# Patient Record
Sex: MALE | Race: WHITE | URBAN - METROPOLITAN AREA
[De-identification: names, ages, dates, MRNs, and addresses within clinical notes are randomized per-mention and may not be internally consistent; named-entity substitution may affect disease eponyms.]

---

## 2022-08-15 ENCOUNTER — APPOINTMENT (OUTPATIENT)
Dept: GENERAL RADIOLOGY | Age: 22
End: 2022-08-15

## 2022-08-15 ENCOUNTER — HOSPITAL ENCOUNTER (EMERGENCY)
Age: 22
Discharge: HOME | End: 2022-08-15
Attending: EMERGENCY MEDICINE

## 2022-08-15 VITALS
RESPIRATION RATE: 18 BRPM | BODY MASS INDEX: 23.03 KG/M2 | TEMPERATURE: 98.8 F | HEART RATE: 116 BPM | WEIGHT: 170 LBS | DIASTOLIC BLOOD PRESSURE: 91 MMHG | HEIGHT: 72 IN | OXYGEN SATURATION: 99 % | SYSTOLIC BLOOD PRESSURE: 123 MMHG

## 2022-08-15 DIAGNOSIS — L03.119 CELLULITIS AND ABSCESS OF LEG: Primary | ICD-10-CM

## 2022-08-15 DIAGNOSIS — L02.419 CELLULITIS AND ABSCESS OF LEG: Primary | ICD-10-CM

## 2022-08-15 PROCEDURE — 99283 EMERGENCY DEPT VISIT LOW MDM: CPT

## 2022-08-15 PROCEDURE — 73562 X-RAY EXAM OF KNEE 3: CPT

## 2022-08-15 RX ORDER — ONDANSETRON 4 MG/1
4 TABLET, FILM COATED ORAL 3 TIMES DAILY PRN
Qty: 15 TABLET | Refills: 0 | Status: SHIPPED | OUTPATIENT
Start: 2022-08-15

## 2022-08-15 RX ORDER — NAPROXEN 375 MG/1
375 TABLET ORAL 2 TIMES DAILY PRN
Qty: 60 TABLET | Refills: 0 | Status: SHIPPED | OUTPATIENT
Start: 2022-08-15

## 2022-08-15 RX ORDER — CHLORHEXIDINE GLUCONATE 4 G/100ML
SOLUTION TOPICAL
Qty: 473 ML | Refills: 0 | Status: SHIPPED | OUTPATIENT
Start: 2022-08-15 | End: 2022-08-29

## 2022-08-15 RX ORDER — CLINDAMYCIN HYDROCHLORIDE 150 MG/1
450 CAPSULE ORAL 3 TIMES DAILY
Qty: 90 CAPSULE | Refills: 0 | Status: SHIPPED | OUTPATIENT
Start: 2022-08-15 | End: 2022-08-25

## 2022-08-15 NOTE — DISCHARGE INSTRUCTIONS
Take the full course of antibiotics. Please follow-up with primary care physician or return to the emergency department in 48 hours for reevaluation.     Return to the emergency department sooner for increasing pain, redness, swelling, pain on movement of your knee, any other concerning symptoms

## 2022-08-15 NOTE — Clinical Note
Toni Willoughby was seen and treated in our emergency department on 8/15/2022. He may return to work on 08/18/2022. If you have any questions or concerns, please don't hesitate to call.       Ashkan Mcgee MD

## 2022-08-15 NOTE — ED NOTES
Discharge education reviewed. Questions answered. Medications clarified. Belongings gathered. Discharge instructions signed. All belongings accounted for. Patient discharged to home via pov.        Amber Jauregui RN  08/15/22 [id number]

## 2022-08-15 NOTE — ED PROVIDER NOTES
Oral (!) 116 18 99 % 6' (1.829 m) 170 lb (77.1 kg)     GENERAL APPEARANCE: Awake and alert. Cooperative. No acute distress. HEAD: Normocephalic. Atraumatic. EYES: Sclera anicteric. Pupils equal round reactive to light extraocular movements are intact  ENT: Tolerates saliva. NECK: Supple. Trachea midline. CARDIO: RRR. Radial pulse 2+. No murmurs rubs or gallops appreciated  LUNGS: Respirations unlabored. CTAB. ABDOMEN: Soft. Non-distended. Non-tender. EXTREMITIES: No acute deformities. No tenderness to palpation over the bony prominence of the fibular head or of the patella. No pain on passive motion of the patient's left knee. No tenderness when palpating over the tibial plateau. No left knee calf tenderness. SKIN: Warm and dry. Mild erythema edema noted to anterior aspect of patient's left knee. Minimal active drainage is appreciated. No fluid-filled collections that might be amenable to incision and drainage on initial palpation. NEUROLOGICAL:  Cranial nerves II through XII grossly intact. No gross facial drooping. Moves all 4 extremities spontaneously. PSYCHIATRIC: Normal mood. Alert and oriented x3. No reported active suicidality or homicidality. Diagnostics   Labs:  No results found for this visit on 08/15/22. Radiographs:  XR KNEE LEFT (3 VIEWS)    Result Date: 8/15/2022  EXAMINATION: THREE XRAY VIEWS OF THE LEFT KNEE 8/15/2022 5:18 pm COMPARISON: None. HISTORY: ORDERING SYSTEM PROVIDED HISTORY: left knee swelling TECHNOLOGIST PROVIDED HISTORY: Reason for exam:->left knee swelling FINDINGS: Osseous structures of the knee are intact and align normally. No retained radiopaque foreign body. Joint spaces appear well maintained. No acute osseous abnormality evident. Follow-up or additional imaging should be considered if pain persists or worsens.       Procedures/EKG:       ED Course and MDM   In brief, Ann Marie Reza is a 20 y.o. male who presented to the emergency department for evaluation of redness and swelling to the anterior aspect of his left knee. Based on patient's history and physical does seem most consistent with a cellulitis or possible abscess. Other possibilities patient's symptoms do include infected bursitis. There is no pain on passive motion of the patient's left knee no pain when palpating throughout the knee joint low clinical suspicion for septic arthritis. I did review patient's imaging studies as noted above. I do not believe any laboratory work is necessary at this time. I did perform an ultrasound of the patient's right knee. No fluid was noted in the joint. No prepatellar fluid collections are appreciated on ultrasound. Discussed the findings with patient and family at bedside. Recommends starting the patient on clindamycin for infection milligrams p.o. 3 times daily as well as use of chlorhexidine scrub on his knee. Close follow-up in the next 48 hours return to the emergency department for reevaluation return to emergency department sooner for increasing pain, swelling, difficulty walking or any other concerning symptoms he did express a verbal understanding of these instructions and does feel comfortable to be discharged home    ED Medication Orders (From admission, onward)      None            Final Impression      1.  Cellulitis and abscess of leg      DISPOSITION Decision To Discharge 08/15/2022 06:50:20 PM         (Please note that portions of this note may have been completed with a voice recognition program. Efforts were made to edit the dictations but occasionally words are mis-transcribed.)    Ashkan Mcgee MD  [address] [place] [address]        Ashkan Mcgee MD  08/15/22 [id number]

## 2022-08-18 ENCOUNTER — HOSPITAL ENCOUNTER (EMERGENCY)
Age: 22
Discharge: HOME | End: 2022-08-18
Attending: EMERGENCY MEDICINE

## 2022-08-18 VITALS
RESPIRATION RATE: 16 BRPM | HEIGHT: 72 IN | DIASTOLIC BLOOD PRESSURE: 84 MMHG | TEMPERATURE: 98.5 F | HEART RATE: 108 BPM | OXYGEN SATURATION: 97 % | WEIGHT: 170 LBS | SYSTOLIC BLOOD PRESSURE: 127 MMHG | BODY MASS INDEX: 23.03 KG/M2

## 2022-08-18 DIAGNOSIS — L02.416 ABSCESS OF SKIN OF LEFT KNEE: Primary | ICD-10-CM

## 2022-08-18 PROCEDURE — 99282 EMERGENCY DEPT VISIT SF MDM: CPT

## 2022-08-18 ASSESSMENT — PAIN - FUNCTIONAL ASSESSMENT: PAIN_FUNCTIONAL_ASSESSMENT: 0-10

## 2022-08-18 ASSESSMENT — LIFESTYLE VARIABLES
HOW MANY STANDARD DRINKS CONTAINING ALCOHOL DO YOU HAVE ON A TYPICAL DAY: PATIENT DOES NOT DRINK
HOW OFTEN DO YOU HAVE A DRINK CONTAINING ALCOHOL: NEVER

## 2022-08-18 ASSESSMENT — PAIN SCALES - GENERAL: PAINLEVEL_OUTOF10: 3

## 2022-08-18 ASSESSMENT — PAIN DESCRIPTION - LOCATION: LOCATION: KNEE

## 2022-08-18 NOTE — Clinical Note
Davey Kawasaki was seen and treated in our emergency department on 8/18/2022. He may return to work on 08/22/2022. If you have any questions or concerns, please don't hesitate to call.       Mohit Bojorquez, DO

## 2022-08-19 NOTE — DISCHARGE INSTRUCTIONS
Establish and follow-up with your primary care physician for reevaluation. Call for an appointment  Take clindamycin antibiotic as prescribed and directed  Use Hibiclens antibacterial soap as prescribed and directed  Take Tylenol alternate with Motrin as needed for pain  Return to the emergency department immediately increased pain swelling fever numbness and tingling in his EXTR or worsening symptoms.

## 2022-08-19 NOTE — ED PROVIDER NOTES
Emergency Department Encounter    Patient: Elvin Lamra  MRN: [id number]  : 2000  Date of Evaluation: 2022  ED Provider:  Da Brandt DO    Triage Chief Complaint:   Wound Check    [place]:  Elvin Lamar is a 20 y.o. male that presents to the emergency department for evaluation of left knee wound infection. Patient states he had an abscess and cellulitis to the left knee. He states he was placed on clindamycin antibiotic and Hibiclens naproxen and Zofran. Patient states he had a reevaluation couple days ago in the emergency department. Patient states he just got clindamycin antibiotic today. He states the redness has decreased dramatically lots of improvement. He states he still having pain when he bends his knee. 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 He states no fever chills or cough. Patient here for wound check and work excuse. He denies any chest pain shortness of breath dizzy lightheaded nausea vomiting abdominal pain. ROS - see HPI, below listed is current ROS at time of my eval:  General:  No fevers, no chills, no weakness  Eyes:  No recent vison changes, no discharge  ENT:  No sore throat, no nasal congestion, no hearing changes  Cardiovascular:  No chest pain, no palpitations  Respiratory:  No shortness of breath, no cough, no wheezing  Gastrointestinal:  No pain, no nausea, no vomiting, no diarrhea  Musculoskeletal:  No muscle pain, no joint pain  Skin:  No rash, no pruritis, no easy bruising  Neurologic:  No speech problems, no headache, no extremity numbness, no extremity tingling, no extremity weakness  Psychiatric:  No anxiety  Genitourinary:  No dysuria, no hematuria  Endocrine:  No unexpected weight gain, no unexpected weight loss  Extremities: Positive for left knee infection no edema, no pain    History reviewed. No pertinent past medical history. History reviewed.  No pertinent surgical history. History reviewed. No pertinent family history. Social History     Socioeconomic History    Marital status: Single     Spouse name: Not on file    Number of children: Not on file    Years of education: Not on file    Highest education level: Not on file   Occupational History    Not on file   Tobacco Use    Smoking status: Every Day     Types: Cigarettes    Smokeless tobacco: Never   Vaping Use    Vaping Use: Every day   Substance and Sexual Activity    Alcohol use: Not Currently    Drug use: Yes     Types: Marijuana Diana Gouty)    Sexual activity: Not on file   Other Topics Concern    Not on file   Social History Narrative    Not on file     Social Determinants of Health     Financial Resource Strain: Not on file   Food Insecurity: Not on file   Transportation Needs: Not on file   Physical Activity: Not on file   Stress: Not on file   Social Connections: Not on file   Intimate Partner Violence: Not on file   Housing Stability: Not on file     No current facility-administered medications for this encounter. Current Outpatient Medications   Medication Sig Dispense Refill    clindamycin (CLEOCIN) 150 MG capsule Take 3 capsules by mouth in the morning and 3 capsules at noon and 3 capsules before bedtime. Do all this for 10 days. 90 capsule 0    ondansetron (ZOFRAN) 4 MG tablet Take 1 tablet by mouth 3 times daily as needed for Nausea or Vomiting 15 tablet 0    naproxen (NAPROSYN) 375 MG tablet Take 1 tablet by mouth 2 times daily as needed for Pain 60 tablet 0    chlorhexidine (HIBICLENS) 4 % external liquid Apply topically daily as needed.  473 mL 0     No Known Allergies    Nursing Notes Reviewed    Physical Exam:  Triage VS:    ED Triage Vitals [08/18/22 2234]   Enc Vitals Group      /84      Heart Rate (!) 108      Resp 16      Temp 98.5 °F (36.9 °C)      Temp Source Infrared      SpO2 97 %      Weight 170 lb (77.1 kg)      Height 6' (1.829 m)      Head Circumference       Peak Flow       Pain Score Pain Loc       Pain Edu? Excl. in [address]? My pulse ox interpretation is - normal    General appearance:  No acute distress. Skin:  Warm. Dry. Eye:  Extraocular movements intact. Ears, nose, mouth and throat:  Oral mucosa moist   Neck:  Trachea midline. Extremity: Left anterior knee with healing abscess, some drainage noted, minimal erythema around the wound, normal flexion-extension at the the left knee, intact distal pulses left lower extremity, ambulatory no gait ataxia, no calf muscle tenderness, no swelling. Normal ROM     Heart:  Regular rate and rhythm, normal S1 & S2, no extra heart sounds. Perfusion:  intact  Respiratory:  Lungs clear to auscultation bilaterally. Respirations nonlabored. Abdominal:  Normal bowel sounds. Soft. Nontender. Non distended. Back:  No CVA tenderness to palpation     Neurological:  Alert and oriented times 3. No focal neuro deficits. Psychiatric:  Appropriate    I have reviewed and interpreted all of the currently available lab results from this visit (if applicable):  No results found for this visit on 08/18/22. Radiographs (if obtained):  Radiologist's Report Reviewed:  No results found. EKG (if obtained): (All EKG's are interpreted by myself in the absence of a cardiologist)      MDM:  Patient presents emergency department for reevaluation. Patient diagnosed with abscess and cellulitis of the left knee last week. Patient states symptoms have gotten better dramatically he states he just got his clindamycin antibiotic prescription but the Hibiclens antibacterial soap has been working well. 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 He is requesting work excuse. Wound of. Prerecorded patient he states he had cellulitis on the entire knee at 1 point. Today patient does have wound with a little drainage from and mild erythema around the wound.   No rosaline cellulitis noted intact pulses afebrile does not appear toxic or ill. Kelsey with patient he will need to take those antibiotic clindamycin 3 times a day for 10 days. We will give him work excuse. patient is return immediately worsening symptoms. Clinical Impression:  1. Abscess of skin of left knee      Disposition referral (if applicable):  Jane Espinoza MD  [id number] [facility]  [phone]    Schedule an appointment as soon as possible for a visit in 4 days  If symptoms worsen, For wound re-check    Elvia You [address]69  [address] [facility]  [phone]  Go in 1 day  If symptoms worsen    Disposition medications (if applicable):  New Prescriptions    No medications on file     ED Provider Disposition Time  DISPOSITION Decision To Discharge 08/18/2022 11:07:17 PM      Comment: Please note this report has been produced using speech recognition software and may contain errors related to that system including errors in grammar, punctuation, and spelling, as well as words and phrases that may be inappropriate. Efforts were made to edit the dictations.         Cara Mccarthy,   08/18/22 [id number]